# Patient Record
Sex: FEMALE | Race: WHITE | NOT HISPANIC OR LATINO | Employment: OTHER | URBAN - NONMETROPOLITAN AREA
[De-identification: names, ages, dates, MRNs, and addresses within clinical notes are randomized per-mention and may not be internally consistent; named-entity substitution may affect disease eponyms.]

---

## 2020-01-12 ENCOUNTER — APPOINTMENT (EMERGENCY)
Dept: CT IMAGING | Facility: HOSPITAL | Age: 85
End: 2020-01-12
Payer: MEDICARE

## 2020-01-12 ENCOUNTER — HOSPITAL ENCOUNTER (EMERGENCY)
Facility: HOSPITAL | Age: 85
Discharge: HOME | End: 2020-01-12
Attending: EMERGENCY MEDICINE | Admitting: EMERGENCY MEDICINE
Payer: MEDICARE

## 2020-01-12 VITALS
BODY MASS INDEX: 22.43 KG/M2 | OXYGEN SATURATION: 99 % | DIASTOLIC BLOOD PRESSURE: 68 MMHG | SYSTOLIC BLOOD PRESSURE: 166 MMHG | TEMPERATURE: 97.6 F | HEIGHT: 66 IN | HEART RATE: 63 BPM | WEIGHT: 139.6 LBS | RESPIRATION RATE: 18 BRPM

## 2020-01-12 DIAGNOSIS — N20.0 KIDNEY STONE: ICD-10-CM

## 2020-01-12 DIAGNOSIS — N39.0 UTI (URINARY TRACT INFECTION): Primary | ICD-10-CM

## 2020-01-12 DIAGNOSIS — R31.9 HEMATURIA: ICD-10-CM

## 2020-01-12 DIAGNOSIS — B37.3 CANDIDAL VULVITIS: ICD-10-CM

## 2020-01-12 LAB
ANION GAP SERPL CALCULATED.3IONS-SCNC: 5 MMOL/L (ref 4–13)
BACTERIA UR QL AUTO: ABNORMAL /HPF
BASOPHILS # BLD AUTO: 0.03 THOUSANDS/ΜL (ref 0–0.1)
BASOPHILS NFR BLD AUTO: 1 % (ref 0–1)
BILIRUB UR QL STRIP: NEGATIVE
BUN SERPL-MCNC: 21 MG/DL (ref 5–25)
CALCIUM SERPL-MCNC: 10.3 MG/DL (ref 8.3–10.1)
CHLORIDE SERPL-SCNC: 106 MMOL/L (ref 100–108)
CLARITY UR: ABNORMAL
CO2 SERPL-SCNC: 32 MMOL/L (ref 21–32)
COLOR UR: YELLOW
CREAT SERPL-MCNC: 0.93 MG/DL (ref 0.6–1.3)
EOSINOPHIL # BLD AUTO: 0.04 THOUSAND/ΜL (ref 0–0.61)
EOSINOPHIL NFR BLD AUTO: 1 % (ref 0–6)
ERYTHROCYTE [DISTWIDTH] IN BLOOD BY AUTOMATED COUNT: 14.3 % (ref 11.6–15.1)
GFR SERPL CREATININE-BSD FRML MDRD: 55 ML/MIN/1.73SQ M
GLUCOSE SERPL-MCNC: 89 MG/DL (ref 65–140)
GLUCOSE UR STRIP-MCNC: NEGATIVE MG/DL
HCT VFR BLD AUTO: 34 % (ref 34.8–46.1)
HGB BLD-MCNC: 11.2 G/DL (ref 11.5–15.4)
HGB UR QL STRIP.AUTO: ABNORMAL
IMM GRANULOCYTES # BLD AUTO: 0.03 THOUSAND/UL (ref 0–0.2)
IMM GRANULOCYTES NFR BLD AUTO: 1 % (ref 0–2)
KETONES UR STRIP-MCNC: NEGATIVE MG/DL
LEUKOCYTE ESTERASE UR QL STRIP: ABNORMAL
LYMPHOCYTES # BLD AUTO: 1.02 THOUSANDS/ΜL (ref 0.6–4.47)
LYMPHOCYTES NFR BLD AUTO: 25 % (ref 14–44)
MCH RBC QN AUTO: 31.4 PG (ref 26.8–34.3)
MCHC RBC AUTO-ENTMCNC: 32.9 G/DL (ref 31.4–37.4)
MCV RBC AUTO: 95 FL (ref 82–98)
MONOCYTES # BLD AUTO: 0.67 THOUSAND/ΜL (ref 0.17–1.22)
MONOCYTES NFR BLD AUTO: 17 % (ref 4–12)
NEUTROPHILS # BLD AUTO: 2.28 THOUSANDS/ΜL (ref 1.85–7.62)
NEUTS SEG NFR BLD AUTO: 55 % (ref 43–75)
NITRITE UR QL STRIP: NEGATIVE
NON-SQ EPI CELLS URNS QL MICRO: ABNORMAL /HPF
NRBC BLD AUTO-RTO: 0 /100 WBCS
PH UR STRIP.AUTO: 6 [PH]
PLATELET # BLD AUTO: 167 THOUSANDS/UL (ref 149–390)
PMV BLD AUTO: 11.6 FL (ref 8.9–12.7)
POTASSIUM SERPL-SCNC: 5.4 MMOL/L (ref 3.5–5.3)
PROT UR STRIP-MCNC: ABNORMAL MG/DL
RBC # BLD AUTO: 3.57 MILLION/UL (ref 3.81–5.12)
RBC #/AREA URNS AUTO: ABNORMAL /HPF
SODIUM SERPL-SCNC: 143 MMOL/L (ref 136–145)
SP GR UR STRIP.AUTO: 1.02 (ref 1–1.03)
UROBILINOGEN UR QL STRIP.AUTO: 0.2 E.U./DL
WBC # BLD AUTO: 4.07 THOUSAND/UL (ref 4.31–10.16)
WBC #/AREA URNS AUTO: ABNORMAL /HPF

## 2020-01-12 PROCEDURE — 36415 COLL VENOUS BLD VENIPUNCTURE: CPT | Performed by: EMERGENCY MEDICINE

## 2020-01-12 PROCEDURE — 85025 COMPLETE CBC W/AUTO DIFF WBC: CPT | Performed by: EMERGENCY MEDICINE

## 2020-01-12 PROCEDURE — 87086 URINE CULTURE/COLONY COUNT: CPT | Performed by: EMERGENCY MEDICINE

## 2020-01-12 PROCEDURE — 99284 EMERGENCY DEPT VISIT MOD MDM: CPT

## 2020-01-12 PROCEDURE — 80048 BASIC METABOLIC PNL TOTAL CA: CPT | Performed by: EMERGENCY MEDICINE

## 2020-01-12 PROCEDURE — 81001 URINALYSIS AUTO W/SCOPE: CPT | Performed by: EMERGENCY MEDICINE

## 2020-01-12 PROCEDURE — 99284 EMERGENCY DEPT VISIT MOD MDM: CPT | Performed by: EMERGENCY MEDICINE

## 2020-01-12 PROCEDURE — 96366 THER/PROPH/DIAG IV INF ADDON: CPT

## 2020-01-12 PROCEDURE — 96365 THER/PROPH/DIAG IV INF INIT: CPT

## 2020-01-12 PROCEDURE — 74177 CT ABD & PELVIS W/CONTRAST: CPT

## 2020-01-12 RX ORDER — CIPROFLOXACIN 250 MG/1
250 TABLET, FILM COATED ORAL EVERY 12 HOURS SCHEDULED
COMMUNITY

## 2020-01-12 RX ORDER — FLUCONAZOLE 100 MG/1
200 TABLET ORAL ONCE
Status: COMPLETED | OUTPATIENT
Start: 2020-01-12 | End: 2020-01-12

## 2020-01-12 RX ORDER — RANITIDINE HCL 75 MG
75 TABLET ORAL 2 TIMES DAILY
COMMUNITY

## 2020-01-12 RX ORDER — FLUCONAZOLE 150 MG/1
TABLET ORAL
Qty: 3 TABLET | Refills: 0 | Status: SHIPPED | OUTPATIENT
Start: 2020-01-12 | End: 2020-01-21

## 2020-01-12 RX ORDER — NYSTATIN AND TRIAMCINOLONE ACETONIDE 100000; 1 [USP'U]/G; MG/G
OINTMENT TOPICAL 2 TIMES DAILY
Qty: 30 G | Refills: 0 | Status: SHIPPED | OUTPATIENT
Start: 2020-01-12 | End: 2020-01-12 | Stop reason: SDUPTHER

## 2020-01-12 RX ORDER — NYSTATIN AND TRIAMCINOLONE ACETONIDE 100000; 1 [USP'U]/G; MG/G
OINTMENT TOPICAL 2 TIMES DAILY
Qty: 30 G | Refills: 0 | Status: SHIPPED | OUTPATIENT
Start: 2020-01-12

## 2020-01-12 RX ORDER — CEFTRIAXONE 1 G/50ML
1000 INJECTION, SOLUTION INTRAVENOUS ONCE
Status: COMPLETED | OUTPATIENT
Start: 2020-01-12 | End: 2020-01-12

## 2020-01-12 RX ORDER — BALSALAZIDE DISODIUM 750 MG/1
750 CAPSULE ORAL 3 TIMES DAILY
COMMUNITY

## 2020-01-12 RX ORDER — CEPHALEXIN 250 MG/1
250 CAPSULE ORAL EVERY 12 HOURS SCHEDULED
Qty: 14 CAPSULE | Refills: 0 | Status: SHIPPED | OUTPATIENT
Start: 2020-01-12 | End: 2020-01-19

## 2020-01-12 RX ORDER — NYSTATIN AND TRIAMCINOLONE ACETONIDE 100000; 1 [USP'U]/G; MG/G
OINTMENT TOPICAL 2 TIMES DAILY
Status: DISCONTINUED | OUTPATIENT
Start: 2020-01-12 | End: 2020-01-12 | Stop reason: CLARIF

## 2020-01-12 RX ORDER — FLUCONAZOLE 150 MG/1
TABLET ORAL
Qty: 3 TABLET | Refills: 0 | Status: SHIPPED | OUTPATIENT
Start: 2020-01-12 | End: 2020-01-12 | Stop reason: SDUPTHER

## 2020-01-12 RX ORDER — GABAPENTIN 100 MG/1
100 CAPSULE ORAL 2 TIMES DAILY
COMMUNITY

## 2020-01-12 RX ADMIN — IOHEXOL 100 ML: 350 INJECTION, SOLUTION INTRAVENOUS at 17:53

## 2020-01-12 RX ADMIN — FLUCONAZOLE 200 MG: 100 TABLET ORAL at 17:23

## 2020-01-12 RX ADMIN — CEFTRIAXONE 1000 MG: 1 INJECTION, SOLUTION INTRAVENOUS at 17:27

## 2020-01-12 RX ADMIN — NYSTATIN AND TRIAMCINOLONE ACETONIDE: 100000; 1 CREAM TOPICAL at 17:29

## 2020-01-12 RX ADMIN — SODIUM CHLORIDE 500 ML: 0.9 INJECTION, SOLUTION INTRAVENOUS at 17:21

## 2020-01-12 NOTE — ED PROVIDER NOTES
History  Chief Complaint   Patient presents with    Possible UTI     pt states she has burning with urination and also has a rash on her groin starting a month ago     51-year-old female presents with rash x1 month  Patient states she had similar rash in Spring 2019 was diagnosed with Candida infection of the vulvar area  Patient was treated with fluconazole at that time with resolution  Patient states about 4 weeks ago she started having redness to the labia majora which has progressed to cover the medial inner thigh bilaterally, vulva, perineum and gluteal cleft  Patient states she has severe stinging and discomfort upon urination and has been applying nystatin powder with no improvement  Patient denies fever, chills, nausea, vomiting, abdominal pain, urinary urgency or frequency, vaginal bleeding, diarrhea, hematochezia or melena  Patient wears Depends for urinary incontinence  Denies history of cancer  Denies history of change in clothing, soaps, laundry detergent and drug allergies  Denies vaginal discharge or lesions  Patient lives alone and her daughter is at bedside who checks in on her daily        History provided by:  Relative and patient   used: No    Rash   Location:  Pelvis  Pelvic rash location:  Vulva, gluteal cleft and perineum  Quality: burning, painful and redness    Pain details:     Quality:  Burning, stinging and sore    Severity:  Moderate    Onset quality:  Gradual    Duration:  1 month    Timing:  Constant    Progression:  Worsening  Severity:  Moderate  Onset quality:  Gradual  Duration:  1 month  Timing:  Constant  Progression:  Worsening  Chronicity:  Recurrent  Context: diapers    Context: not animal contact, not chemical exposure, not eggs, not exposure to similar rash, not food, not hot tub use, not insect bite/sting, not medications, not new detergent/soap, not nuts, not plant contact, not pollen, not sick contacts and not sun exposure    Relieved by: Nothing  Worsened by:  Contact and moisture  Associated symptoms: no abdominal pain, no diarrhea, no fatigue, no fever, no headaches, no hoarse voice, no induration, no joint pain, no myalgias, no nausea, no periorbital edema, no shortness of breath, no sore throat, no throat swelling, no tongue swelling, no URI, not vomiting and not wheezing        Prior to Admission Medications   Prescriptions Last Dose Informant Patient Reported? Taking?   balsalazide (COLAZAL) 750 mg capsule   Yes Yes   Sig: Take 750 mg by mouth 3 (three) times a day   ciprofloxacin (CIPRO) 250 mg tablet   Yes Yes   Sig: Take 250 mg by mouth every 12 (twelve) hours   gabapentin (NEURONTIN) 100 mg capsule   Yes Yes   Sig: Take 100 mg by mouth 2 (two) times a day   ranitidine (ZANTAC) 75 MG tablet   Yes Yes   Sig: Take 75 mg by mouth 2 (two) times a day      Facility-Administered Medications: None       History reviewed  No pertinent past medical history  Past Surgical History:   Procedure Laterality Date    BACK SURGERY      KIDNEY SURGERY         History reviewed  No pertinent family history  I have reviewed and agree with the history as documented  Social History     Tobacco Use    Smoking status: Never Smoker    Smokeless tobacco: Never Used   Substance Use Topics    Alcohol use: Not Currently    Drug use: Never        Review of Systems   Constitutional: Negative for chills, diaphoresis, fatigue and fever  HENT: Negative for congestion, drooling, facial swelling, hoarse voice, nosebleeds, sneezing, sore throat, trouble swallowing and voice change  Eyes: Negative for photophobia, pain and visual disturbance  Respiratory: Negative for cough, chest tightness, shortness of breath and wheezing  Cardiovascular: Negative for chest pain, palpitations and leg swelling  Gastrointestinal: Negative for abdominal pain, constipation, diarrhea, nausea and vomiting  Genitourinary: Positive for difficulty urinating (stinging)  Negative for decreased urine volume, dyspareunia, dysuria, flank pain, frequency, genital sores, hematuria, pelvic pain, urgency, vaginal bleeding and vaginal discharge  Musculoskeletal: Negative for arthralgias, back pain, myalgias, neck pain and neck stiffness  Skin: Positive for color change and rash  Negative for pallor and wound  Allergic/Immunologic: Negative for immunocompromised state  Neurological: Negative for dizziness, tremors, seizures, syncope, facial asymmetry, speech difficulty, weakness, light-headedness, numbness and headaches  Hematological: Negative for adenopathy  Psychiatric/Behavioral: Negative for agitation, confusion, hallucinations and suicidal ideas  The patient is not nervous/anxious  Physical Exam  Physical Exam   Constitutional: She is oriented to person, place, and time  She appears well-developed and well-nourished  She is cooperative  Non-toxic appearance  She does not have a sickly appearance  She does not appear ill  No distress  HENT:   Head: Normocephalic and atraumatic  Right Ear: Hearing, tympanic membrane, external ear and ear canal normal    Left Ear: Hearing, tympanic membrane, external ear and ear canal normal    Nose: Nose normal  Right sinus exhibits no maxillary sinus tenderness and no frontal sinus tenderness  Left sinus exhibits no maxillary sinus tenderness and no frontal sinus tenderness  Mouth/Throat: Uvula is midline, oropharynx is clear and moist and mucous membranes are normal  No oropharyngeal exudate, posterior oropharyngeal edema, posterior oropharyngeal erythema or tonsillar abscesses  Eyes: Pupils are equal, round, and reactive to light  Conjunctivae, EOM and lids are normal    Neck: Trachea normal, normal range of motion, full passive range of motion without pain and phonation normal  Neck supple  No thyroid mass and no thyromegaly present     Cardiovascular: Normal rate, regular rhythm, S1 normal, S2 normal, normal heart sounds, intact distal pulses and normal pulses  Pulses:       Radial pulses are 2+ on the right side, and 2+ on the left side  Dorsalis pedis pulses are 2+ on the right side, and 2+ on the left side  Pulmonary/Chest: Effort normal and breath sounds normal  No accessory muscle usage or stridor  No tachypnea  No respiratory distress  She has no decreased breath sounds  She has no wheezes  She has no rhonchi  She has no rales  She exhibits no mass, no tenderness, no deformity and no retraction  Abdominal: Soft  Bowel sounds are normal  She exhibits no distension, no ascites and no mass  There is no hepatosplenomegaly  There is no tenderness  There is no rigidity, no rebound, no guarding, no CVA tenderness, no tenderness at McBurney's point and negative Sanches's sign  No hernia  Hernia confirmed negative in the right inguinal area and confirmed negative in the left inguinal area  Genitourinary: Rectal exam shows external hemorrhoid (non thrombosed)  Rectal exam shows no internal hemorrhoid, no fissure, no mass, no tenderness and anal tone normal        Pelvic exam was performed with patient supine  No labial fusion  There is rash and tenderness on the right labia  There is no lesion or injury on the right labia  There is rash and tenderness on the left labia  There is no lesion or injury on the left labia  There is erythema (at introitus) in the vagina  No vaginal discharge found  Genitourinary Comments: Diffuse erythematous rash of vulvar area, perineum and gluteal cleft  Area is macerated in places but not bleeding  Erythematous satellite lesions  Entire vulvar and perineal area is tender to palpation  Not malodorous no vaginal discharge   Musculoskeletal: Normal range of motion  She exhibits no edema, tenderness or deformity  Lymphadenopathy:     She has no cervical adenopathy  No inguinal adenopathy noted on the right or left side  Neurological: She is alert and oriented to person, place, and time  She has normal strength  She is not disoriented  She displays no atrophy and no tremor  No cranial nerve deficit or sensory deficit  She exhibits normal muscle tone  She displays a negative Romberg sign  She displays no seizure activity  Coordination and gait normal  GCS eye subscore is 4  GCS verbal subscore is 5  GCS motor subscore is 6  Patient is AAOx4, GCS 15; speaking clearly and appropriately; motor and sensation intact; visual fields intact; cranial nerves II-XII grossly intact; no facial droop, slurred speech or arm drift   Skin: Skin is warm, dry and intact  Capillary refill takes less than 2 seconds  Rash noted  No abrasion, no bruising, no burn, no ecchymosis, no laceration, no lesion and no petechiae noted  Rash is not maculopapular  She is not diaphoretic  There is erythema  No pallor  Psychiatric: She has a normal mood and affect  Her speech is normal and behavior is normal  Judgment and thought content normal  She is not actively hallucinating  Cognition and memory are normal  She is attentive  Nursing note and vitals reviewed        Vital Signs  ED Triage Vitals   Temperature Pulse Respirations Blood Pressure SpO2   01/12/20 1531 01/12/20 1531 01/12/20 1531 01/12/20 1531 01/12/20 1531   97 6 °F (36 4 °C) 71 16 141/67 98 %      Temp Source Heart Rate Source Patient Position - Orthostatic VS BP Location FiO2 (%)   01/12/20 1531 01/12/20 1531 01/12/20 1531 01/12/20 1531 --   Temporal Monitor Sitting Right arm       Pain Score       01/12/20 1938       No Pain           Vitals:    01/12/20 1531 01/12/20 1938   BP: 141/67 166/68   Pulse: 71 63   Patient Position - Orthostatic VS: Sitting Lying         Visual Acuity      ED Medications  Medications   sodium chloride 0 9 % bolus 500 mL (0 mL Intravenous Stopped 1/12/20 1906)   cefTRIAXone (ROCEPHIN) IVPB (premix) 1,000 mg (0 mg Intravenous Stopped 1/12/20 1906)   fluconazole (DIFLUCAN) tablet 200 mg (200 mg Oral Given 1/12/20 1723)   iohexol (OMNIPAQUE) 350 MG/ML injection (SINGLE-DOSE) 100 mL (100 mL Intravenous Given 1/12/20 1753)       Diagnostic Studies  Results Reviewed     Procedure Component Value Units Date/Time    Basic metabolic panel [[id number]]  (Abnormal) Collected:  01/12/20 1716    Lab Status:  Final result Specimen:  Blood from Arm, Right Updated:  01/12/20 1734     Sodium 143 mmol/L      Potassium 5 4 mmol/L      Chloride 106 mmol/L      CO2 32 mmol/L      ANION GAP 5 mmol/L      BUN 21 mg/dL      Creatinine 0 93 mg/dL      Glucose 89 mg/dL      Calcium 10 3 mg/dL      eGFR 55 ml/min/1 73sq m     Narrative:       [place] guidelines for Chronic Kidney Disease (CKD):     Stage 1 with normal or high GFR (GFR > 90 mL/min/1 73 square meters)    Stage 2 Mild CKD (GFR = 60-89 mL/min/1 73 square meters)    Stage 3A Moderate CKD (GFR = 45-59 mL/min/1 73 square meters)    Stage 3B Moderate CKD (GFR = 30-44 mL/min/1 73 square meters)    Stage 4 Severe CKD (GFR = 15-29 mL/min/1 73 square meters)    Stage 5 End Stage CKD (GFR <15 mL/min/1 73 square meters)  Note: GFR calculation is accurate only with a steady state creatinine    CBC and differential [[id number]]  (Abnormal) Collected:  01/12/20 1716    Lab Status:  Final result Specimen:  Blood from Arm, Right Updated:  01/12/20 1724     WBC 4 07 Thousand/uL      RBC 3 57 Million/uL      Hemoglobin 11 2 g/dL      Hematocrit 34 0 %      MCV 95 fL      MCH 31 4 pg      MCHC 32 9 g/dL      RDW 14 3 %      MPV 11 6 fL      Platelets 353 Thousands/uL      nRBC 0 /100 WBCs      Neutrophils Relative 55 %      Immat GRANS % 1 %      Lymphocytes Relative 25 %      Monocytes Relative 17 %      Eosinophils Relative 1 %      Basophils Relative 1 %      Neutrophils Absolute 2 28 Thousands/µL      Immature Grans Absolute 0 03 Thousand/uL      Lymphocytes Absolute 1 02 Thousands/µL      Monocytes Absolute 0 67 Thousand/µL      Eosinophils Absolute 0 04 Thousand/µL      Basophils Absolute 0 03 Thousands/µL     Urine Microscopic [[id number]]  (Abnormal) Collected:  01/12/20 1624    Lab Status:  Final result Specimen:  Urine, Clean Catch Updated:  01/12/20 1642     RBC, UA 30-50 /hpf      WBC, UA 30-50 /hpf      Epithelial Cells Moderate /hpf      Bacteria, UA Occasional /hpf     Urine culture [[id number]] Collected:  01/12/20 1624    Lab Status: In process Specimen:  Urine, Clean Catch Updated:  01/12/20 1641    UA w Reflex to Microscopic w Reflex to Culture [[id number]]  (Abnormal) Collected:  01/12/20 1624    Lab Status:  Final result Specimen:  Urine, Clean Catch Updated:  01/12/20 1630     Color, UA Yellow     Clarity, UA Cloudy     Specific [place], UA 1 020     pH, UA 6 0     Leukocytes, UA Large     Nitrite, UA Negative     Protein, UA 30 (1+) mg/dl      Glucose, UA Negative mg/dl      Ketones, UA Negative mg/dl      Urobilinogen, UA 0 2 E U /dl      Bilirubin, UA Negative     Blood, UA Large                 CT abdomen pelvis with contrast   Final Result by Bob Rick MD (01/12 1820)      No acute intra-abdominal pelvic abnormality identified  Vascular lesion partially exophytic from the caudal tip of the left lateral segment with 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  Prior right nephrectomy  Nonobstructing left lower pole stone  Mild diffuse bladder wall thickening  Correlate for acute versus chronic etiology  Workstation performed: [id number]                    Procedures  Procedures         ED Course  ED Course as of Jan 13 1439   Sun Jan 12, 2020   4077 CTAP:IMPRESSION:     No acute intra-abdominal pelvic abnormality identified      Vascular lesion partially exophytic from the caudal tip of the left lateral segment with 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      Prior right nephrectomy      Nonobstructing left lower pole stone      Mild diffuse bladder wall thickening    Correlate for acute versus chronic etiology             1845 Reassessed patient  She has no specific complaints at this time  Will discharge to home with PCP follow-up for her vulvar candidal infection and UTI  Will provide OB referral as needed  Strict return to ER precautions discussed with acknowledgement patient  Daughter at bedside will take her home and will take her to the PCP appointment within the next 3-5 days for reassessment  MDM  Number of Diagnoses or Management Options  Candidal vulvitis:   Hematuria:   Kidney stone:   UTI (urinary tract infection):      Amount and/or Complexity of Data Reviewed  Clinical lab tests: reviewed and ordered  Tests in the radiology section of CPT®: reviewed and ordered  Tests in the medicine section of CPT®: ordered and reviewed  Obtain history from someone other than the patient: yes (Daughter at bedside)  Review and summarize past medical records: yes  Independent visualization of images, tracings, or specimens: yes (CT abdomen pelvis)    Risk of Complications, Morbidity, and/or Mortality  Presenting problems: moderate  Diagnostic procedures: low  Management options: low    Patient Progress  Patient progress: stable        Disposition  Final diagnoses:   UTI (urinary tract infection)   Kidney stone   Candidal vulvitis   Hematuria     Time reflects when diagnosis was documented in both MDM as applicable and the Disposition within this note     Time User Action Codes Description Comment    1/12/2020  6:49 PM Murlene Handy Add [N39 0] UTI (urinary tract infection)     1/12/2020  6:49 PM Murlene Handy Add [N20 0] Kidney stone     1/12/2020  6:50 PM Murlene Handy Add [B37 3] Candidal vulvitis     1/13/2020  2:38 PM Murlene Handy Add [R31 9] Hematuria       ED Disposition     ED Disposition Condition Date/Time Comment    Discharge Stable Sun Jan 12, 2020  6:49 PM [facility] discharge to home/self care              Follow-up Information     Follow up With Specialties Details Why Contact Info    Humphrey Rojas MD Internal Medicine Call in 1 day Please follow-up with your primary care physician regarding your vulvitis and Candida infection  [address] [facility] [place] [address] [phone]      Mahesh Staley MD Obstetrics and Gynecology Call in 1 day Call [facility] for evaluation of your vulvar rash as needed  [address]  [facility]  [address] [facility]  [phone]            Discharge Medication List as of 1/12/2020  6:57 PM      START taking these medications    Details   cephalexin (KEFLEX) 250 mg capsule Take 1 capsule (250 mg total) by mouth every 12 (twelve) hours for 7 days, Starting Sun 1/12/2020, Until Sun 1/19/2020, Normal      fluconazole (DIFLUCAN) 150 mg tablet Take one fluconazole 150mg tablet every 72 hours for three doses  , Print      nystatin-triamcinolone (MYCOLOG-II) ointment Apply topically 2 (two) times a day For two days, Starting Sun 1/12/2020, Print         CONTINUE these medications which have NOT CHANGED    Details   balsalazide (COLAZAL) 750 mg capsule Take 750 mg by mouth 3 (three) times a day, Historical Med      ciprofloxacin (CIPRO) 250 mg tablet Take 250 mg by mouth every 12 (twelve) hours, Historical Med      gabapentin (NEURONTIN) 100 mg capsule Take 100 mg by mouth 2 (two) times a day, Historical Med      ranitidine (ZANTAC) 75 MG tablet Take 75 mg by mouth 2 (two) times a day, Historical Med           No discharge procedures on file      ED Provider  Electronically Signed by      MD Juan Garner MD  01/13/20 3993

## 2020-01-13 LAB — BACTERIA UR CULT: NORMAL
